# Patient Record
Sex: MALE | Race: WHITE | NOT HISPANIC OR LATINO | Employment: OTHER | URBAN - METROPOLITAN AREA
[De-identification: names, ages, dates, MRNs, and addresses within clinical notes are randomized per-mention and may not be internally consistent; named-entity substitution may affect disease eponyms.]

---

## 2021-12-17 ENCOUNTER — TELEPHONE (OUTPATIENT)
Dept: GASTROENTEROLOGY | Facility: CLINIC | Age: 86
End: 2021-12-17

## 2022-03-14 ENCOUNTER — TELEPHONE (OUTPATIENT)
Dept: GASTROENTEROLOGY | Facility: CLINIC | Age: 87
End: 2022-03-14

## 2022-03-14 ENCOUNTER — OFFICE VISIT (OUTPATIENT)
Dept: GASTROENTEROLOGY | Facility: CLINIC | Age: 87
End: 2022-03-14
Payer: COMMERCIAL

## 2022-03-14 VITALS
WEIGHT: 185 LBS | DIASTOLIC BLOOD PRESSURE: 78 MMHG | BODY MASS INDEX: 25.06 KG/M2 | SYSTOLIC BLOOD PRESSURE: 122 MMHG | HEIGHT: 72 IN | HEART RATE: 51 BPM

## 2022-03-14 DIAGNOSIS — R19.7 DIARRHEA, UNSPECIFIED TYPE: ICD-10-CM

## 2022-03-14 DIAGNOSIS — Z85.528 HISTORY OF KIDNEY CANCER: ICD-10-CM

## 2022-03-14 DIAGNOSIS — C18.2 MALIGNANT NEOPLASM OF ASCENDING COLON (HCC): ICD-10-CM

## 2022-03-14 DIAGNOSIS — Z79.01 ENCOUNTER FOR CURRENT LONG-TERM USE OF ANTICOAGULANTS: ICD-10-CM

## 2022-03-14 DIAGNOSIS — C18.7 CANCER OF SIGMOID COLON (HCC): Primary | ICD-10-CM

## 2022-03-14 DIAGNOSIS — N18.9 CHRONIC KIDNEY DISEASE, UNSPECIFIED CKD STAGE: ICD-10-CM

## 2022-03-14 DIAGNOSIS — Z15.09 LYNCH SYNDROME: ICD-10-CM

## 2022-03-14 DIAGNOSIS — C34.2 MALIGNANT NEOPLASM OF MIDDLE LOBE OF RIGHT LUNG (HCC): ICD-10-CM

## 2022-03-14 DIAGNOSIS — U07.1 COVID-19: ICD-10-CM

## 2022-03-14 DIAGNOSIS — Z90.49 H/O RIGHT HEMICOLECTOMY: ICD-10-CM

## 2022-03-14 PROCEDURE — 99214 OFFICE O/P EST MOD 30 MIN: CPT | Performed by: INTERNAL MEDICINE

## 2022-03-14 RX ORDER — LOPERAMIDE HYDROCHLORIDE 2 MG/1
2 TABLET ORAL 4 TIMES DAILY PRN
COMMUNITY
End: 2022-03-14

## 2022-03-14 RX ORDER — FLUTICASONE FUROATE, UMECLIDINIUM BROMIDE AND VILANTEROL TRIFENATATE 100; 62.5; 25 UG/1; UG/1; UG/1
1 POWDER RESPIRATORY (INHALATION) DAILY
COMMUNITY

## 2022-03-14 RX ORDER — ESCITALOPRAM OXALATE 5 MG/1
5 TABLET ORAL DAILY
COMMUNITY

## 2022-03-14 RX ORDER — ACETAMINOPHEN 325 MG/1
650 TABLET ORAL EVERY 4 HOURS PRN
COMMUNITY

## 2022-03-14 RX ORDER — FINASTERIDE 5 MG/1
5 TABLET, FILM COATED ORAL DAILY
COMMUNITY

## 2022-03-14 RX ORDER — ECHINACEA PURPUREA EXTRACT 125 MG
1 TABLET ORAL 2 TIMES DAILY
COMMUNITY

## 2022-03-14 RX ORDER — METOPROLOL SUCCINATE 50 MG/1
50 TABLET, EXTENDED RELEASE ORAL DAILY
COMMUNITY

## 2022-03-14 RX ORDER — TAMSULOSIN HYDROCHLORIDE 0.4 MG/1
0.4 CAPSULE ORAL
COMMUNITY

## 2022-03-14 RX ORDER — MELATONIN
1000 DAILY
COMMUNITY

## 2022-03-14 RX ORDER — ATORVASTATIN CALCIUM 40 MG/1
40 TABLET, FILM COATED ORAL DAILY
COMMUNITY

## 2022-03-14 RX ORDER — FAMOTIDINE 20 MG/1
20 TABLET, FILM COATED ORAL DAILY
COMMUNITY

## 2022-03-14 RX ORDER — LOPERAMIDE HYDROCHLORIDE 2 MG/1
2 CAPSULE ORAL 4 TIMES DAILY PRN
Qty: 120 CAPSULE | Refills: 3 | Status: SHIPPED | OUTPATIENT
Start: 2022-03-14 | End: 2022-04-11 | Stop reason: SDUPTHER

## 2022-03-14 RX ORDER — LEVOTHYROXINE SODIUM 0.03 MG/1
25 TABLET ORAL DAILY
COMMUNITY

## 2022-03-14 NOTE — PATIENT INSTRUCTIONS
[facility] - Outpatient Consultation  Lelia Bartlett 80 y o  male MRN: [id number]  Encounter: [id number]    ASSESSMENT AND PLAN:      1  Cancer of sigmoid colon [facility])  ---Patient with a malignant polyp resected endoscopically in [facility] by gastroenterologist in spring of 2021  Margins showed invasive carcinoma  Polyp was about 2 2 cm in greatest diameter  Patient had a repeat sigmoidoscopy and the endoscopist - could not relocate the polyp in evidently it had been eradicated  - discussed the situation with the patient and daughter  Conceivably the patient may have no residual polyp where  cancer tissue was located   -  Recommend repeat colonoscopy at hospital setting [place] or [address] [place] [address] in the near future   GoLYTELY or MiraLax prep  -    2  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)  -- patient with 5 radiation therapies- appears to be in remission  3  Villalobos syndrome    --  Patient with multiple cancers related to [facility]  He had a hemicolectomy at age 72 for right colon cancer  This was in the late 1990s  Ten years later had left-sided colon cancer in her resection  Most recently malignant polyp noted  --EGD at same time as colonoscopy     4  H/O right hemicolectomy  -- please see above    5  Encounter for current long-term use of anticoagulants  -- patient had a history of a stroke and history of PE in the past   Will hold  Xareltofor 48 hours prior to  - check with patient's cardiologist   Some small risk of embolic or thrombotic activity with holding the anticoagulate but would be able to do polypectomy if necessary    6  COVID-19  -- a patient without complications related to [place]  He did have aspiration pneumonia but this was prior to COVID diagnosis  This is in late December  Did not require hospitalization for the "    7   Malignant neoplasm of ascending colon ([facility] [address] )  ---reason for the right hemicolectomy back in the 1990s    8  History of kidney cancer  -- left nephrectomy also patient had uterine polyp in the past- also related to NSAID    9  Chronic kidney disease, unspecified CKD stage  -- stage 4 kidney disease  Patient's creatinine was about 3 3 back in January    10  Diarrhea, unspecified type  -- patient reports about 4 loose bowel movements per day  This is a little worse than usual   May be related to his multiple colon resections    Try loperamide 2 mg 2 tablets twice a day before breakfast and before lunch  - could consider  Trial of Questran if not effective      Followup Appointment: 3 mo

## 2022-03-14 NOTE — LETTER
March 17, 2022     Nadeen Acuna MD  [address] [place] [address] Drive  [address]  [address] [facility]    Patient: David Allen   YOB: 1933   Date of Visit: 3/14/2022       Dear Dr Niko Sanderson: Thank you for referring David Allen to me for evaluation  Below are my notes for this consultation  If you have questions, please do not hesitate to call me  I look forward to following your patient along with you  Sincerely,        Gila Jacobs MD        CC: DO Gila Santoro MD  3/17/2022 10:48 AM  Incomplete    [facility] - Outpatient Consultation  David Allen 80 y o  male MRN: [id number]  Encounter: [id number]    ASSESSMENT AND PLAN:      1  Cancer of sigmoid colon [facility])  ---Patient with a malignant polyp resected endoscopically in [facility] by gastroenterologist in spring of 2021  Margins showed invasive carcinoma  Polyp was about 2 2 cm in greatest diameter  Patient had a repeat sigmoidoscopy and the endoscopies could not relocate the polyp in evidently it had been eradicated  - discussed the situation with the patient and daughter  Conceivably the patient may have no residual polyp were cancer tissue   -  Recommend repeat colonoscopy at hospital setting [place] or [address] [place] [address] in the near future   GoLYTELY or MiraLax prep  -    2  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 [facility])  -- patient with 5 radiation therapies- appears to be in remission  3  Villalobos syndrome    --  Patient with multiple cancers related to [facility]  He had a hemicolectomy at age 72 for right colon cancer  This was in the late 1990s  Ten years later had left-sided colon cancer in her resection  Most recently malignant polyp noted  --EGD at same time as colonoscopy     4  H/O right hemicolectomy  -- please see above    5   Encounter for current long-term use of anticoagulants  -- patient had a history of a stroke and history of PE in the past   Will hold  Xareltofor 48 hours prior to  - check with patient's cardiologist   Some small risk of embolic or thrombotic activity with holding the anticoagulate but would be able to do polypectomy if necessary    6  COVID-19  -- a patient without complications related to [place]  He did have aspiration pneumonia but this was prior to COVID diagnosis  This is in late December  Did not require hospitalization for the "    7  Malignant neoplasm of ascending colon ([facility] [address] )  ---reason for the right hemicolectomy back in the 1990s    8  History of kidney cancer  -- left nephrectomy also patient had uterine polyp in the past- also related to NSAID    9  Chronic kidney disease, unspecified CKD stage  -- stage 4 kidney disease  Patient's creatinine was about 3 3 back in January    10  Diarrhea, unspecified type  -- patient reports about 4 loose bowel movements per day  This is a little worse than usual   May be related to his multiple colon resections  Try loperamide 2 mg 2 tablets twice a day before breakfast and before lunch  - could consider  Trial of Questran if not effective      Followup Appointment: 3 mo   ______________________________________________________________________    Chief Complaint   Patient presents with    Hx of colon ca, wants new GI dr      Patient is referred by his oncologist Dr Stephen  for evaluation of a history of a malignant sigmoid colon polyp    HPI:   Erendira Lira is a 80y o  year old male who presents  For GI evaluation for history of Villalobos syndrome and a history of a malignant sigmoid colon polyp  Patient has complicated history with respect to colon cancer and had a right hemicolectomy for lesion in the late 1990s  About 10 years later he had subsequent left colon resection for  Subsequent tumor  Patient underwent colonoscopy last year and had a 2 2 cm polyp in greatest dimension in the sigmoid colon  This was resected    Pathology came back positive for invasive carcinoma the margins  This was done last April  Patient has had a subsequent  In June 2021 sigmoidoscopy for tattooing of the area as he was referred for surgery  The lesion was not visible on reinspection at that time  Prep may have been subopitmal at that time  Orignal report from April stated the lesion was in the descending colon  Patient does report over the last year so having increasing stool frequency  He has about 4 loose bowel movements a day  He does have some urgency and occasional incontinence  Denies any blood per rectum  There is no abdominal pain  With respect to his other issues patient was diagnosed with non-small cell lung cancer I believe right middle lobe  This was treated with 5 sessions of radiation therapy  This seems to be under good control at present according to the patient's daughter  Patient also had a nephrectomy in the past for kidney cancer and this involved the ureter as well  As result he does have issues with chronic kidney disease  Patient had 2 siblings who succumbed to cancer both with urogenital cancer  Patient did have recent hospitalization for aspiration pneumonia  This was in late December  When he was about to be discharged she was tested positive for COVID and was observed in the hospital further  He did not have complications related to COVID  He is not on oxygen  Patient has had a history of a syncopal episode and have pacemaker insertion  Patient does have a history of PE in the past and history of CVA and is on Xarelto    Other medical problems include congestive heart failure    Historical Information   Past Medical History:   Diagnosis Date    Allergic rhinitis     Anemia     Atrial fibrillation (HCC)     Cancer of kidney ([facility] [address] )     Cardiac defibrillator in place     Chronic diastolic (congestive) heart failure (HCC)     Chronic kidney disease     Colon cancer ([facility] [address] )     Colon polyp     COPD (chronic obstructive pulmonary disease) ([facility] [address] )     Coronary artery disease     Diabetes mellitus ([facility] )     Dysphagia     GERD (gastroesophageal reflux disease)     Hyperkalemia     Hyperlipidemia     Hyperosmolality and hypernatremia     Hypertension     Hypothyroidism     Lung cancer ([facility] )     Neuromuscular dysfunction of bladder, unspecified     Nonrheumatic aortic (valve) stenosis     Polyneuropathy     Pulmonary embolism (HCC)     Sick sinus syndrome (HCC)     Venous insufficiency      Past Surgical History:   Procedure Laterality Date    ABDOMINAL SURGERY      APPENDECTOMY      CARDIAC DEFIBRILLATOR PLACEMENT      COLON SURGERY      COLONOSCOPY      HERNIA REPAIR      UPPER GASTROINTESTINAL ENDOSCOPY       Social History     Substance and Sexual Activity   Alcohol Use Not Currently     Social History     Substance and Sexual Activity   Drug Use Never     Social History     Tobacco Use   Smoking Status Never Smoker   Smokeless Tobacco Never Used     Family History   Problem Relation Age of Onset    Heart disease Mother     Heart disease Father     Cancer Sister     Cancer Brother        Meds/Allergies     Current Outpatient Medications:     acetaminophen (TYLENOL) 325 mg tablet    albuterol (Albuterol Sulfate) (5 mg/mL) 0 5 % nebulizer solution    atorvastatin (LIPITOR) 40 mg tablet    cholecalciferol (VITAMIN D3) 1,000 units tablet    cyanocobalamin (VITAMIN B-12) 1000 MCG tablet    escitalopram (LEXAPRO) 5 mg tablet    famotidine (PEPCID) 20 mg tablet    finasteride (PROSCAR) 5 mg tablet    fluticasone-umeclidinium-vilanterol (Trelegy Ellipta) 100-62 5-25 MCG/INH inhaler    levothyroxine 25 mcg tablet    metoprolol succinate (TOPROL-XL) 50 mg 24 hr tablet    Multiple Vitamins-Minerals (PRESERVISION AREDS PO)    rivaroxaban (Xarelto) 15 mg tablet    sodium chloride (Deep Sea Nasal [place]) 0 65 % nasal spray    tamsulosin (Flomax) 0 4 mg    Allergies   Allergen Reactions    Aspirin Other (See Comments)     Unknown    Augmentin [Amoxicillin-Pot Clavulanate] Other (See Comments)     Unknown      Avelox [Moxifloxacin] Other (See Comments)     Unknown      Cephalosporins Other (See Comments)     unknown    Clindamycin Other (See Comments)     unknown    Iv Contrast [Iodinated Diagnostic Agents] Other (See Comments)     unknown    Latex Other (See Comments)     Unknown      Lisinopril Other (See Comments)     Unknown      Penicillins Other (See Comments)     Unknown         PHYSICAL EXAM:    Blood pressure 122/78, pulse (!) 51, height 6' (1 829 m), weight 83 9 kg (185 lb)  Body mass index is 25 09 kg/m²  General Appearance: NAD, cooperative, alert  Eyes: Anicteric, conjunctiva pink  ENT:  Normocephalic, atraumatic, normal mucosa  Neck:  Supple, symmetrical, trachea midline,   Resp:  Clear to auscultation bilaterally; no rales, rhonchi or wheezing; respirations unlabored   CV:  S1 S2, Regular rate and rhythm; no murmur, rub, or gallop  GI:  Soft, non-tender, non-distended; normal bowel sounds; no masses, no organomegaly -- midline abdominal scar  Rectal: Deferred  Musculoskeletal: No cyanosis, clubbing or edema  Normal ROM  Skin:  No jaundice, rashes, or lesions -- fragile skin and easy bruisability  Heme/Lymph: No palpable cervical lymphadenopathy  Psych: Normal affect, good eye contact  Neuro: No gross deficits, AAOx3    Lab Results:    most recent hemoglobin February 2022 11 2 normal MCV  Creatinine January 2022 3 3      REVIEW OF SYSTEMS:    CONSTITUTIONAL: Denies any fever, chills, rigors, positive for 20 lb weight loss in the last 4-6 months  HEENT: No earache or tinnitus  Positive for recent nose bleeds possibly related to nasal O2 in the hospital  CARDIOVASCULAR: No chest pain or palpitations- positive for shortness of breath with exertion  RESPIRATORY: Denies any cough, hemoptysis  GASTROINTESTINAL: As noted in the History of Present Illness  GENITOURINARY: No problems with urination   Denies any hematuria or dysuria  NEUROLOGIC: No dizziness or vertigo, denies headaches  MUSCULOSKELETAL: Denies any muscle or joint pain  SKIN: Denies skin rashes or itching - positive for easy skin bruises  ENDOCRINE: Denies excessive thirst  Denies intolerance to heat or cold  PSYCHOSOCIAL:-- perhaps mild reactive depression lost his wife about 2 years ago now in skilled nursing  Denies any recent memory loss  Ana Boone MD  3/14/2022 12:54 PM  Sign when Signing Visit    [email or web address] - Outpatient Consultation  Roosevelt Cadena 80 y o  male MRN: [id number]  Encounter: [id number]    ASSESSMENT AND PLAN:      1  Cancer of sigmoid colon [facility])  ---Patient with a malignant polyp resected endoscopically in [facility] by gastroenterologist in spring of 2021  Margins showed invasive carcinoma  Polyp was about 2 2 cm in greatest diameter  Patient had a repeat sigmoidoscopy and the endoscopies could not relocate the polyp in evidently it had been eradicated  - discussed the situation with the patient and daughter  Conceivably the patient may have no residual polyp were cancer tissue   -  Recommend repeat colonoscopy at hospital setting [place] or [address] [place] [address] in the near future   GoLYTELY or MiraLax prep  -    2  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 [facility])  -- patient with 5 radiation therapies- appears to be in remission  3  Villalobos syndrome    --  Patient with multiple cancers related to [facility]  He had a hemicolectomy at age 72 for right colon cancer  This was in the late 1990s  Ten years later had left-sided colon cancer in her resection  Most recently malignant polyp noted  --EGD at same time as colonoscopy     4  H/O right hemicolectomy  -- please see above    5   Encounter for current long-term use of anticoagulants  -- patient had a history of a stroke and history of PE in the past   Will hold  Xareltofor 48 hours prior to  - check with patient's cardiologist   Some small risk of embolic or thrombotic activity with holding the anticoagulate but would be able to do polypectomy if necessary    6  COVID-19  -- a patient without complications related to [place]  He did have aspiration pneumonia but this was prior to COVID diagnosis  This is in late December  Did not require hospitalization for the "    7  Malignant neoplasm of ascending colon ([facility] [address] )  ---reason for the right hemicolectomy back in the 1990s    8  History of kidney cancer  -- left nephrectomy also patient had uterine polyp in the past- also related to NSAID    9  Chronic kidney disease, unspecified CKD stage  -- stage 4 kidney disease  Patient's creatinine was about 3 3 back in January    10  Diarrhea, unspecified type  -- patient reports about 4 loose bowel movements per day  This is a little worse than usual   May be related to his multiple colon resections  Try loperamide 2 mg 2 tablets twice a day before breakfast and before lunch  - could consider  Trial of Questran if not effective      Followup Appointment: 3 mo   ______________________________________________________________________    Chief Complaint   Patient presents with    Hx of colon ca, wants new GI dr      Patient is referred by his oncologist Dr Stephen  for evaluation of a history of a malignant sigmoid colon polyp  HPI:   Wilbert Bonilla is a 80y o  year old male who presents  For GI evaluation for history of Villalobos syndrome and a history of a malignant sigmoid colon polyp  Patient has complicated history with respect to colon cancer and had a right hemicolectomy for lesion in the late 1990s  About 10 years later he had subsequent left colon resection for  Subsequent tumor  Patient underwent colonoscopy last year and had a 2 2 cm polyp in greatest dimension in the sigmoid colon  This was resected  Pathology came back positive for invasive carcinoma the margins  This was done last April    Patient has had a subsequent sigmoidoscopy for tattooing of the area as he was referred for surgery  The lesion was not visible on reinspection at that time  Patient does report over the last year so having increasing stool frequency  He has about 4 loose bowel movements a day  He does have some urgency and occasional incontinence  Denies any blood per rectum  There is no abdominal pain  With respect to his other issues patient was diagnosed with non-small cell lung cancer I believe right middle lobe  This was treated with 5 sessions of radiation therapy  This seems to be under good control at present according to the patient's daughter  Patient also had a nephrectomy in the past for kidney cancer and this involved the ureter as well  As result he does have issues with chronic kidney disease  Patient had 2 siblings who succumbed to cancer both with urogenital cancer  Patient did have recent hospitalization for aspiration pneumonia  This was in late December  When he was about to be discharged she was tested positive for COVID and was observed in the hospital further  He did not have complications related to COVID  He is not on oxygen  Patient has had a history of a syncopal episode and have pacemaker insertion  Patient does have a history of PE in the past and history of CVA and is on Xarelto    Other medical problems include congestive heart failure    Historical Information   Past Medical History:   Diagnosis Date    Allergic rhinitis     Anemia     Atrial fibrillation (HCC)     Cancer [facility] kidney ([facility] [address] )     Cardiac defibrillator in place     Chronic diastolic (congestive) heart failure (HCC)     Chronic kidney disease     Colon cancer (HCC)     Colon polyp     COPD (chronic obstructive pulmonary disease) (HCC)     Coronary artery disease     Diabetes mellitus ([address] )     Dysphagia     GERD (gastroesophageal reflux disease)     Hyperkalemia     Hyperlipidemia     Hyperosmolality and hypernatremia     Hypertension     Hypothyroidism     [facility]     Neuromuscular dysfunction of bladder, unspecified     Nonrheumatic aortic (valve) stenosis     Polyneuropathy     Pulmonary embolism (HCC)     Sick sinus syndrome ([facility] )     Venous insufficiency      Past Surgical History:   Procedure Laterality Date    ABDOMINAL SURGERY      APPENDECTOMY      CARDIAC DEFIBRILLATOR PLACEMENT      COLON SURGERY      COLONOSCOPY      HERNIA REPAIR      UPPER GASTROINTESTINAL ENDOSCOPY       Social History     Substance and Sexual Activity   Alcohol Use Not Currently     Social History     Substance and Sexual Activity   Drug Use Never     Social History     Tobacco Use   Smoking Status Never Smoker   Smokeless Tobacco Never Used     Family History   Problem Relation Age of Onset    Heart disease Mother     Heart disease Father     Cancer Sister     Cancer Brother        Meds/Allergies     Current Outpatient Medications:     acetaminophen (TYLENOL) 325 mg tablet    albuterol (Albuterol Sulfate) (5 mg/mL) 0 5 % nebulizer solution    atorvastatin (LIPITOR) 40 mg tablet    cholecalciferol (VITAMIN D3) 1,000 units tablet    cyanocobalamin (VITAMIN B-12) 1000 MCG tablet    escitalopram (LEXAPRO) 5 mg tablet    famotidine (PEPCID) 20 mg tablet    finasteride (PROSCAR) 5 mg tablet    fluticasone-umeclidinium-vilanterol (Trelegy Ellipta) 100-62 5-25 MCG/INH inhaler    levothyroxine 25 mcg tablet    metoprolol succinate (TOPROL-XL) 50 mg 24 hr tablet    Multiple Vitamins-Minerals (PRESERVISION AREDS PO)    rivaroxaban (Xarelto) 15 mg tablet    sodium chloride (Deep Sea Nasal [place]) 0 65 % nasal spray    tamsulosin (Flomax) 0 4 mg    Allergies   Allergen Reactions    Aspirin Other (See Comments)     Unknown    Augmentin [Amoxicillin-Pot Clavulanate] Other (See Comments)     Unknown      Avelox [Moxifloxacin] Other (See Comments)     Unknown      Cephalosporins Other (See Comments)     unknown    Clindamycin Other (See Comments)     unknown    Iv Contrast [Iodinated Diagnostic Agents] Other (See Comments)     unknown    Latex Other (See Comments)     Unknown      Lisinopril Other (See Comments)     Unknown      Penicillins Other (See Comments)     Unknown         PHYSICAL EXAM:    Blood pressure 122/78, pulse (!) 51, height 6' (1 829 m), weight 83 9 kg (185 lb)  Body mass index is 25 09 kg/m²  General Appearance: NAD, cooperative, alert  Eyes: Anicteric, conjunctiva pink  ENT:  Normocephalic, atraumatic, normal mucosa  Neck:  Supple, symmetrical, trachea midline,   Resp:  Clear to auscultation bilaterally; no rales, rhonchi or wheezing; respirations unlabored   CV:  S1 S2, Regular rate and rhythm; no murmur, rub, or gallop  GI:  Soft, non-tender, non-distended; normal bowel sounds; no masses, no organomegaly -- midline abdominal scar  Rectal: Deferred  Musculoskeletal: No cyanosis, clubbing or edema  Normal ROM  Skin:  No jaundice, rashes, or lesions -- fragile skin and easy bruisability  Heme/Lymph: No palpable cervical lymphadenopathy  Psych: Normal affect, good eye contact  Neuro: No gross deficits, AAOx3    Lab Results:    most recent hemoglobin February 2022 11 2 normal MCV  Creatinine January 2022 3 3      REVIEW OF SYSTEMS:    CONSTITUTIONAL: Denies any fever, chills, rigors, positive for 20 lb weight loss in the last 4-6 months  HEENT: No earache or tinnitus  Positive for recent nose bleeds possibly related to nasal O2 in the hospital  CARDIOVASCULAR: No chest pain or palpitations- positive for shortness of breath with exertion  RESPIRATORY: Denies any cough, hemoptysis  GASTROINTESTINAL: As noted in the History of Present Illness  GENITOURINARY: No problems with urination  Denies any hematuria or dysuria  NEUROLOGIC: No dizziness or vertigo, denies headaches  MUSCULOSKELETAL: Denies any muscle or joint pain     SKIN: Denies skin rashes or itching - positive for easy skin bruises  ENDOCRINE: Denies excessive thirst  Denies intolerance to heat or cold  PSYCHOSOCIAL:-- perhaps mild reactive depression lost his wife about 2 years ago now in skilled nursing  Denies any recent memory loss

## 2022-03-14 NOTE — PROGRESS NOTES
[address] [employer] [employer] [facility] - Outpatient Consultation  Yasmine Nicholson 80 y o  male MRN: [id number]  Encounter: [id number]    ASSESSMENT AND PLAN:      1  Cancer of sigmoid colon [facility])  ---Patient with a malignant polyp resected endoscopically in [facility] by gastroenterologist in spring of 2021  Margins showed invasive carcinoma  Polyp was about 2 2 cm in greatest diameter  Patient had a repeat sigmoidoscopy and the endoscopies could not relocate the polyp in evidently it had been eradicated  - discussed the situation with the patient and daughter  Conceivably the patient may have no residual polyp were cancer tissue   -  Recommend repeat colonoscopy at hospital setting [facility] or [address] [place] [address] in the near future   GoLYTELY or MiraLax prep  -    2  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 [facility])  -- patient with 5 radiation therapies- appears to be in remission  3  Villalobos syndrome    --  Patient with multiple cancers related to [facility]  He had a hemicolectomy at age 72 for right colon cancer  This was in the late 1990s  Ten years later had left-sided colon cancer in her resection  Most recently malignant polyp noted  --EGD at same time as colonoscopy     4  H/O right hemicolectomy  -- please see above    5  Encounter for current long-term use of anticoagulants  -- patient had a history of a stroke and history of PE in the past   Will hold  Xareltofor 48 hours prior to  - check with patient's cardiologist   Some small risk of embolic or thrombotic activity with holding the anticoagulate but would be able to do polypectomy if necessary    6  COVID-19  -- a patient without complications related to [place]  He did have aspiration pneumonia but this was prior to COVID diagnosis  This is in late December  Did not require hospitalization for the "    7  Malignant neoplasm of ascending colon ([facility] [address] )  ---reason for the right hemicolectomy back in the 1990s    8  History of kidney cancer  -- left nephrectomy also patient had uterine polyp in the past- also related to NSAID    9  Chronic kidney disease, unspecified CKD stage  -- stage 4 kidney disease  Patient's creatinine was about 3 3 back in January    10  Diarrhea, unspecified type  -- patient reports about 4 loose bowel movements per day  This is a little worse than usual   May be related to his multiple colon resections  Try loperamide 2 mg 2 tablets twice a day before breakfast and before lunch  - could consider  Trial of Questran if not effective      Followup Appointment: 3 mo   ______________________________________________________________________    Chief Complaint   Patient presents with    Hx of colon ca, wants new GI dr      Patient is referred by his oncologist Dr Stephen  for evaluation of a history of a malignant sigmoid colon polyp    HPI:   Erendira Lira is a 80y o  year old male who presents  For GI evaluation for history of Villalobos syndrome and a history of a malignant sigmoid colon polyp  Patient has complicated history with respect to colon cancer and had a right hemicolectomy for lesion in the late 1990s  About 10 years later he had subsequent left colon resection for  Subsequent tumor  Patient underwent colonoscopy last year and had a 2 2 cm polyp in greatest dimension in the sigmoid colon  This was resected  Pathology came back positive for invasive carcinoma the margins  This was done last April  Patient has had a subsequent  In June 2021 sigmoidoscopy for tattooing of the area as he was referred for surgery  The lesion was not visible on reinspection at that time  Prep may have been subopitmal at that time  Orignal report from April stated the lesion was in the descending colon  Patient does report over the last year so having increasing stool frequency  He has about 4 loose bowel movements a day  He does have some urgency and occasional incontinence  Denies any blood per rectum    There is no abdominal pain  With respect to his other issues patient was diagnosed with non-small cell lung cancer I believe right middle lobe  This was treated with 5 sessions of radiation therapy  This seems to be under good control at present according to the patient's daughter  Patient also had a nephrectomy in the past for kidney cancer and this involved the ureter as well  As result he does have issues with chronic kidney disease  Patient had 2 siblings who succumbed to cancer both with urogenital cancer  Patient did have recent hospitalization for aspiration pneumonia  This was in late December  When he was about to be discharged she was tested positive for COVID and was observed in the hospital further  He did not have complications related to COVID  He is not on oxygen  Patient has had a history of a syncopal episode and have pacemaker insertion  Patient does have a history of PE in the past and history of CVA and is on Xarelto    Other medical problems include congestive heart failure    Historical Information   Past Medical History:   Diagnosis Date    Allergic rhinitis     Anemia     Atrial fibrillation (HCC)     Cancer of kidney ([facility] [address] )     Cardiac defibrillator in place     Chronic diastolic (congestive) heart failure (HCC)     Chronic kidney disease     Colon cancer (HCC)     Colon polyp     COPD (chronic obstructive pulmonary disease) (HCC)     Coronary artery disease     Diabetes mellitus ([address] )     Dysphagia     GERD (gastroesophageal reflux disease)     Hyperkalemia     Hyperlipidemia     Hyperosmolality and hypernatremia     Hypertension     Hypothyroidism     Lung cancer ([facility] [facility] )     Neuromuscular dysfunction of bladder, unspecified     Nonrheumatic aortic (valve) stenosis     Polyneuropathy     Pulmonary embolism (HCC)     Sick sinus syndrome (HCC)     Venous insufficiency      Past Surgical History:   Procedure Laterality Date    ABDOMINAL SURGERY      APPENDECTOMY      CARDIAC DEFIBRILLATOR PLACEMENT      COLON SURGERY      COLONOSCOPY      HERNIA REPAIR      UPPER GASTROINTESTINAL ENDOSCOPY       Social History     Substance and Sexual Activity   Alcohol Use Not Currently     Social History     Substance and Sexual Activity   Drug Use Never     Social History     Tobacco Use   Smoking Status Never Smoker   Smokeless Tobacco Never Used     Family History   Problem Relation Age of Onset    Heart disease Mother     Heart disease Father     Cancer Sister     Cancer Brother        Meds/Allergies     Current Outpatient Medications:     acetaminophen (TYLENOL) 325 mg tablet    albuterol (Albuterol Sulfate) (5 mg/mL) 0 5 % nebulizer solution    atorvastatin (LIPITOR) 40 mg tablet    cholecalciferol (VITAMIN D3) 1,000 units tablet    cyanocobalamin (VITAMIN B-12) 1000 MCG tablet    escitalopram (LEXAPRO) 5 mg tablet    famotidine (PEPCID) 20 mg tablet    finasteride (PROSCAR) 5 mg tablet    fluticasone-umeclidinium-vilanterol (Trelegy Ellipta) 100-62 5-25 MCG/INH inhaler    levothyroxine 25 mcg tablet    metoprolol succinate (TOPROL-XL) 50 mg 24 hr tablet    Multiple Vitamins-Minerals (PRESERVISION AREDS PO)    rivaroxaban (Xarelto) 15 mg tablet    sodium chloride (Deep Sea Nasal [place]) 0 65 % nasal spray    tamsulosin (Flomax) 0 4 mg    Allergies   Allergen Reactions    Aspirin Other (See Comments)     Unknown    Augmentin [Amoxicillin-Pot Clavulanate] Other (See Comments)     Unknown      Avelox [Moxifloxacin] Other (See Comments)     Unknown      Cephalosporins Other (See Comments)     unknown    Clindamycin Other (See Comments)     unknown    Iv Contrast [Iodinated Diagnostic Agents] Other (See Comments)     unknown    Latex Other (See Comments)     Unknown      Lisinopril Other (See Comments)     Unknown      Penicillins Other (See Comments)     Unknown         PHYSICAL EXAM:    Blood pressure 122/78, pulse (!) 51, height 6' (1 829 m), weight 83 9 kg (185 lb)  Body mass index is 25 09 kg/m²  General Appearance: NAD, cooperative, alert  Eyes: Anicteric, conjunctiva pink  ENT:  Normocephalic, atraumatic, normal mucosa  Neck:  Supple, symmetrical, trachea midline,   Resp:  Clear to auscultation bilaterally; no rales, rhonchi or wheezing; respirations unlabored   CV:  S1 S2, Regular rate and rhythm; no murmur, rub, or gallop  GI:  Soft, non-tender, non-distended; normal bowel sounds; no masses, no organomegaly -- midline abdominal scar  Rectal: Deferred  Musculoskeletal: No cyanosis, clubbing or edema  Normal ROM  Skin:  No jaundice, rashes, or lesions -- fragile skin and easy bruisability  Heme/Lymph: No palpable cervical lymphadenopathy  Psych: Normal affect, good eye contact  Neuro: No gross deficits, AAOx3    Lab Results:    most recent hemoglobin February 2022 11 2 normal MCV  Creatinine January 2022 3 3      REVIEW OF SYSTEMS:    CONSTITUTIONAL: Denies any fever, chills, rigors, positive for 20 lb weight loss in the last 4-6 months  HEENT: No earache or tinnitus  Positive for recent nose bleeds possibly related to nasal O2 in the hospital  CARDIOVASCULAR: No chest pain or palpitations- positive for shortness of breath with exertion  RESPIRATORY: Denies any cough, hemoptysis  GASTROINTESTINAL: As noted in the History of Present Illness  GENITOURINARY: No problems with urination  Denies any hematuria or dysuria  NEUROLOGIC: No dizziness or vertigo, denies headaches  MUSCULOSKELETAL: Denies any muscle or joint pain  SKIN: Denies skin rashes or itching - positive for easy skin bruises  ENDOCRINE: Denies excessive thirst  Denies intolerance to heat or cold  PSYCHOSOCIAL:-- perhaps mild reactive depression lost his wife about 2 years ago now in skilled nursing  Denies any recent memory loss

## 2022-03-14 NOTE — TELEPHONE ENCOUNTER
Scheduled date of colonoscopy (as of today):4/20/22  Physician performing colonoscopy:Dr Daisy Eduardo  Location of colonoscopy:[facility]  Bowel prep reviewed with patient:Colyte  Instructions reviewed with patient by: Lodema Forget  Clearances:  Yes

## 2022-03-17 ENCOUNTER — TELEPHONE (OUTPATIENT)
Dept: GASTROENTEROLOGY | Facility: CLINIC | Age: 87
End: 2022-03-17

## 2022-03-17 NOTE — TELEPHONE ENCOUNTER
Need Cardiac clearance from patient's cardiologist - Dr Calin Young-- in [facility] care everywhere  Please send copy of report - my note - to Dr Quintin Suarez - can't find in epic address t - please check with daughter -  Thanks     Keyonna Smith

## 2022-04-01 NOTE — TELEPHONE ENCOUNTER
Spoke with Bethany Chandler at [facility] and advised of Xarelto hold for two days  Last dose will be 4/17/22  She advised that patient's latest Hgb  Was 5 2 and that he had 3 heme positive stools  She will fax current hgb  results

## 2022-04-05 ENCOUNTER — TELEPHONE (OUTPATIENT)
Dept: GASTROENTEROLOGY | Facility: CLINIC | Age: 87
End: 2022-04-05

## 2022-04-05 NOTE — TELEPHONE ENCOUNTER
Duy Ingram, nurse at [facility] states she needs clarification on Imodium order  She is reviewing AVS 3/14 which indicates Imodium 4xday as needed  Pt's daughter said in the office that the Dr clearly stated to take 4x/day; questions the "as needed"  Also, she is unsure if the Dr is aware that the Pt takes 4 mg every morning? # [id number] [phone]

## 2022-04-11 DIAGNOSIS — R19.7 DIARRHEA, UNSPECIFIED TYPE: ICD-10-CM

## 2022-04-11 RX ORDER — LOPERAMIDE HYDROCHLORIDE 2 MG/1
2 CAPSULE ORAL 4 TIMES DAILY PRN
Qty: 120 CAPSULE | Refills: 3 | Status: SHIPPED | OUTPATIENT
Start: 2022-04-11 | End: 2022-05-11

## 2022-04-11 RX ORDER — LOPERAMIDE HYDROCHLORIDE 2 MG/1
2 CAPSULE ORAL 4 TIMES DAILY PRN
Qty: 120 CAPSULE | Refills: 3 | Status: SHIPPED | OUTPATIENT
Start: 2022-04-11 | End: 2022-04-11

## 2022-04-11 NOTE — TELEPHONE ENCOUNTER
Patients prescription was set to "print" caretaker requested it be sent electronically to [facility]  Sent as requested      E-Prescribing Status      Outpatient Medication Detail    loperamide (IMODIUM) 2 mg capsule        Sig: Take 1 capsule (2 mg total) by mouth 4 (four) times a day as needed for diarrhea        Sent to pharmacy as: loperamide (IMODIUM) 2 mg capsule        Class: Normal        Route: Oral        E-Prescribing Status: Sent to pharmacy (4/11/2022 12:02 PM EDT)

## 2022-04-12 ENCOUNTER — TELEPHONE (OUTPATIENT)
Dept: GASTROENTEROLOGY | Facility: CLINIC | Age: 87
End: 2022-04-12

## 2022-04-12 DIAGNOSIS — Z15.09 LYNCH SYNDROME: ICD-10-CM

## 2022-04-12 DIAGNOSIS — R19.7 DIARRHEA, UNSPECIFIED TYPE: Primary | ICD-10-CM

## 2022-04-12 NOTE — TELEPHONE ENCOUNTER
I called the patient's daughter and I also called the patient's skilled nursing facility  Left a message is  Patient's hemoglobin is down to 8 9  Was 9 2 about a week ago  Previously hemoglobin was in the normal range early in the year at around 12 g  Agree with stopping Xarelto  Should have a repeat CBC early in the week  If he has black stool or dizziness he should be taken to the local ED

## 2022-04-12 NOTE — TELEPHONE ENCOUNTER
Mia Waldrop from [facility] left [employer] stating NP there had labs drawn and Pt's Hgb is down to 8 9  Pt is on Xarelto; has colon scheduled for possible GI bleed so Xarelto was stopped  Calling to advise that they will ck CBC in one wk  If ques CB # [id number] [phone]

## 2022-04-12 NOTE — TELEPHONE ENCOUNTER
Called [facility] back, spoke with Reese Lovett and she will ask Eliot Jean-Baptiste to call back  Wanted to request results be faxed as well  Please see previous message--pt's hemoglobin is 8 9 and will recheck in 1 week  Pt's hemoglobin from 4/4 was 9 2  Pt's colon/egd is scheduled 4/20  Please send any recommendations, if necessary, back to the clinical pool as I will not be in the rest of the week    Thank you

## 2022-04-13 RX ORDER — SODIUM PICOSULFATE, MAGNESIUM OXIDE, AND ANHYDROUS CITRIC ACID 10; 3.5; 12 MG/160ML; G/160ML; G/160ML
LIQUID ORAL
Qty: 160 ML | Refills: 0 | Status: SHIPPED | OUTPATIENT
Start: 2022-04-13

## 2022-04-13 NOTE — TELEPHONE ENCOUNTER
Patient's dtr wanted to change the prep to Clenpiq  The patient is on a thickened diet and wanted a low volume prep t  I emailed the instructions to Carolee Sky (nurse at the home), patient's dtr will  sample

## 2022-04-19 NOTE — TELEPHONE ENCOUNTER
I called Shantelle Sapp spoke with [address] [phone]  Patient had his CBC drawn 4/18/22 Hgb 9 5  I requested that results be faxed to [phone]  Patient is to have colonoscopy performed at [address] tomorrow

## 2022-04-20 ENCOUNTER — HOSPITAL ENCOUNTER (OUTPATIENT)
Dept: GASTROENTEROLOGY | Facility: HOSPITAL | Age: 87
Setting detail: OUTPATIENT SURGERY
Discharge: HOME | End: 2022-04-20
Attending: INTERNAL MEDICINE
Payer: COMMERCIAL

## 2022-04-20 ENCOUNTER — ANESTHESIA EVENT (OUTPATIENT)
Dept: GASTROENTEROLOGY | Facility: HOSPITAL | Age: 87
End: 2022-04-20

## 2022-04-20 ENCOUNTER — APPOINTMENT (EMERGENCY)
Dept: RADIOLOGY | Facility: HOSPITAL | Age: 87
End: 2022-04-20
Payer: COMMERCIAL

## 2022-04-20 ENCOUNTER — APPOINTMENT (EMERGENCY)
Dept: NON INVASIVE DIAGNOSTICS | Facility: HOSPITAL | Age: 87
End: 2022-04-20
Payer: COMMERCIAL

## 2022-04-20 ENCOUNTER — APPOINTMENT (EMERGENCY)
Dept: RADIOLOGY | Facility: HOSPITAL | Age: 87
End: 2022-04-20
Attending: EMERGENCY MEDICINE
Payer: COMMERCIAL

## 2022-04-20 ENCOUNTER — ANESTHESIA (OUTPATIENT)
Dept: GASTROENTEROLOGY | Facility: HOSPITAL | Age: 87
End: 2022-04-20

## 2022-04-20 ENCOUNTER — HOSPITAL ENCOUNTER (EMERGENCY)
Facility: HOSPITAL | Age: 87
Discharge: HOME | End: 2022-04-20
Attending: EMERGENCY MEDICINE
Payer: COMMERCIAL

## 2022-04-20 VITALS
DIASTOLIC BLOOD PRESSURE: 62 MMHG | RESPIRATION RATE: 20 BRPM | HEART RATE: 69 BPM | HEIGHT: 72 IN | WEIGHT: 190 LBS | TEMPERATURE: 96.7 F | SYSTOLIC BLOOD PRESSURE: 124 MMHG | BODY MASS INDEX: 25.73 KG/M2 | OXYGEN SATURATION: 96 %

## 2022-04-20 VITALS
WEIGHT: 190 LBS | HEIGHT: 72 IN | OXYGEN SATURATION: 94 % | RESPIRATION RATE: 18 BRPM | TEMPERATURE: 97.3 F | BODY MASS INDEX: 25.73 KG/M2 | SYSTOLIC BLOOD PRESSURE: 127 MMHG | HEART RATE: 70 BPM | DIASTOLIC BLOOD PRESSURE: 60 MMHG

## 2022-04-20 DIAGNOSIS — Z15.09 LYNCH SYNDROME: ICD-10-CM

## 2022-04-20 DIAGNOSIS — C18.7 CANCER OF SIGMOID COLON (HCC): ICD-10-CM

## 2022-04-20 DIAGNOSIS — R19.7 DIARRHEA, UNSPECIFIED TYPE: ICD-10-CM

## 2022-04-20 DIAGNOSIS — E87.70 FLUID OVERLOAD: Primary | ICD-10-CM

## 2022-04-20 LAB
ALBUMIN SERPL BCP-MCNC: 2.1 G/DL (ref 3.5–5)
ALP SERPL-CCNC: 117 U/L (ref 46–116)
ALT SERPL W P-5'-P-CCNC: 45 U/L (ref 12–78)
ANION GAP SERPL CALCULATED.3IONS-SCNC: 6 MMOL/L (ref 4–13)
APTT PPP: 33 SECONDS (ref 23–37)
AST SERPL W P-5'-P-CCNC: 22 U/L (ref 5–45)
BASOPHILS # BLD AUTO: 0.01 THOUSANDS/ΜL (ref 0–0.1)
BASOPHILS NFR BLD AUTO: 0 % (ref 0–1)
BILIRUB SERPL-MCNC: 0.3 MG/DL (ref 0.2–1)
BUN SERPL-MCNC: 15 MG/DL (ref 5–25)
CALCIUM ALBUM COR SERPL-MCNC: 9.6 MG/DL (ref 8.3–10.1)
CALCIUM SERPL-MCNC: 8.1 MG/DL (ref 8.3–10.1)
CHLORIDE SERPL-SCNC: 110 MMOL/L (ref 100–108)
CO2 SERPL-SCNC: 25 MMOL/L (ref 21–32)
CREAT SERPL-MCNC: 1.39 MG/DL (ref 0.6–1.3)
EOSINOPHIL # BLD AUTO: 0.04 THOUSAND/ΜL (ref 0–0.61)
EOSINOPHIL NFR BLD AUTO: 1 % (ref 0–6)
ERYTHROCYTE [DISTWIDTH] IN BLOOD BY AUTOMATED COUNT: 16.5 % (ref 11.6–15.1)
GFR SERPL CREATININE-BSD FRML MDRD: 44 ML/MIN/1.73SQ M
GLUCOSE SERPL-MCNC: 103 MG/DL (ref 65–140)
HCT VFR BLD AUTO: 31.9 % (ref 36.5–49.3)
HGB BLD-MCNC: 9.7 G/DL (ref 12–17)
IMM GRANULOCYTES # BLD AUTO: 0.04 THOUSAND/UL (ref 0–0.2)
IMM GRANULOCYTES NFR BLD AUTO: 1 % (ref 0–2)
INR PPP: 1.2 (ref 0.84–1.19)
LYMPHOCYTES # BLD AUTO: 0.43 THOUSANDS/ΜL (ref 0.6–4.47)
LYMPHOCYTES NFR BLD AUTO: 6 % (ref 14–44)
MCH RBC QN AUTO: 31.2 PG (ref 26.8–34.3)
MCHC RBC AUTO-ENTMCNC: 30.4 G/DL (ref 31.4–37.4)
MCV RBC AUTO: 103 FL (ref 82–98)
MONOCYTES # BLD AUTO: 0.79 THOUSAND/ΜL (ref 0.17–1.22)
MONOCYTES NFR BLD AUTO: 12 % (ref 4–12)
NEUTROPHILS # BLD AUTO: 5.46 THOUSANDS/ΜL (ref 1.85–7.62)
NEUTS SEG NFR BLD AUTO: 80 % (ref 43–75)
NRBC BLD AUTO-RTO: 0 /100 WBCS
NT-PROBNP SERPL-MCNC: 4539 PG/ML
PLATELET # BLD AUTO: 162 THOUSANDS/UL (ref 149–390)
PMV BLD AUTO: 11.2 FL (ref 8.9–12.7)
POTASSIUM SERPL-SCNC: 4.3 MMOL/L (ref 3.5–5.3)
PROT SERPL-MCNC: 5.6 G/DL (ref 6.4–8.2)
PROTHROMBIN TIME: 15 SECONDS (ref 11.6–14.5)
RBC # BLD AUTO: 3.11 MILLION/UL (ref 3.88–5.62)
SODIUM SERPL-SCNC: 141 MMOL/L (ref 136–145)
WBC # BLD AUTO: 6.77 THOUSAND/UL (ref 4.31–10.16)

## 2022-04-20 PROCEDURE — 93971 EXTREMITY STUDY: CPT

## 2022-04-20 PROCEDURE — 85610 PROTHROMBIN TIME: CPT | Performed by: EMERGENCY MEDICINE

## 2022-04-20 PROCEDURE — 83880 ASSAY OF NATRIURETIC PEPTIDE: CPT | Performed by: EMERGENCY MEDICINE

## 2022-04-20 PROCEDURE — 93005 ELECTROCARDIOGRAM TRACING: CPT

## 2022-04-20 PROCEDURE — 85730 THROMBOPLASTIN TIME PARTIAL: CPT | Performed by: EMERGENCY MEDICINE

## 2022-04-20 PROCEDURE — 80053 COMPREHEN METABOLIC PANEL: CPT | Performed by: EMERGENCY MEDICINE

## 2022-04-20 PROCEDURE — 36415 COLL VENOUS BLD VENIPUNCTURE: CPT | Performed by: EMERGENCY MEDICINE

## 2022-04-20 PROCEDURE — 96374 THER/PROPH/DIAG INJ IV PUSH: CPT

## 2022-04-20 PROCEDURE — 99285 EMERGENCY DEPT VISIT HI MDM: CPT | Performed by: EMERGENCY MEDICINE

## 2022-04-20 PROCEDURE — 71045 X-RAY EXAM CHEST 1 VIEW: CPT

## 2022-04-20 PROCEDURE — 93971 EXTREMITY STUDY: CPT | Performed by: INTERNAL MEDICINE

## 2022-04-20 PROCEDURE — 85025 COMPLETE CBC W/AUTO DIFF WBC: CPT | Performed by: EMERGENCY MEDICINE

## 2022-04-20 PROCEDURE — 88305 TISSUE EXAM BY PATHOLOGIST: CPT | Performed by: PATHOLOGY

## 2022-04-20 PROCEDURE — 99284 EMERGENCY DEPT VISIT MOD MDM: CPT

## 2022-04-20 PROCEDURE — 43239 EGD BIOPSY SINGLE/MULTIPLE: CPT | Performed by: INTERNAL MEDICINE

## 2022-04-20 PROCEDURE — 45378 DIAGNOSTIC COLONOSCOPY: CPT | Performed by: INTERNAL MEDICINE

## 2022-04-20 RX ORDER — LIDOCAINE HYDROCHLORIDE 10 MG/ML
INJECTION, SOLUTION EPIDURAL; INFILTRATION; INTRACAUDAL; PERINEURAL AS NEEDED
Status: DISCONTINUED | OUTPATIENT
Start: 2022-04-20 | End: 2022-04-20

## 2022-04-20 RX ORDER — PROPOFOL 10 MG/ML
INJECTION, EMULSION INTRAVENOUS AS NEEDED
Status: DISCONTINUED | OUTPATIENT
Start: 2022-04-20 | End: 2022-04-20

## 2022-04-20 RX ORDER — FUROSEMIDE 40 MG/1
40 TABLET ORAL DAILY
Qty: 5 TABLET | Refills: 0 | Status: SHIPPED | OUTPATIENT
Start: 2022-04-21 | End: 2022-04-26

## 2022-04-20 RX ORDER — SODIUM CHLORIDE, SODIUM LACTATE, POTASSIUM CHLORIDE, CALCIUM CHLORIDE 600; 310; 30; 20 MG/100ML; MG/100ML; MG/100ML; MG/100ML
INJECTION, SOLUTION INTRAVENOUS CONTINUOUS PRN
Status: DISCONTINUED | OUTPATIENT
Start: 2022-04-20 | End: 2022-04-20

## 2022-04-20 RX ORDER — FUROSEMIDE 40 MG/1
40 TABLET ORAL DAILY
Qty: 5 TABLET | Refills: 0 | Status: SHIPPED | OUTPATIENT
Start: 2022-04-21 | End: 2022-04-20 | Stop reason: SDUPTHER

## 2022-04-20 RX ORDER — FUROSEMIDE 10 MG/ML
40 INJECTION INTRAMUSCULAR; INTRAVENOUS ONCE
Status: COMPLETED | OUTPATIENT
Start: 2022-04-20 | End: 2022-04-20

## 2022-04-20 RX ORDER — POTASSIUM CHLORIDE 750 MG/1
10 TABLET, EXTENDED RELEASE ORAL DAILY
Qty: 5 TABLET | Refills: 0 | Status: SHIPPED | OUTPATIENT
Start: 2022-04-21 | End: 2022-04-20 | Stop reason: SDUPTHER

## 2022-04-20 RX ORDER — POTASSIUM CHLORIDE 750 MG/1
10 TABLET, EXTENDED RELEASE ORAL DAILY
Qty: 5 TABLET | Refills: 0 | Status: SHIPPED | OUTPATIENT
Start: 2022-04-21 | End: 2022-04-26

## 2022-04-20 RX ADMIN — PROPOFOL 80 MG: 10 INJECTION, EMULSION INTRAVENOUS at 10:16

## 2022-04-20 RX ADMIN — PROPOFOL 20 MG: 10 INJECTION, EMULSION INTRAVENOUS at 10:54

## 2022-04-20 RX ADMIN — LIDOCAINE HYDROCHLORIDE 50 MG: 10 INJECTION, SOLUTION EPIDURAL; INFILTRATION; INTRACAUDAL; PERINEURAL at 10:16

## 2022-04-20 RX ADMIN — PROPOFOL 20 MG: 10 INJECTION, EMULSION INTRAVENOUS at 10:49

## 2022-04-20 RX ADMIN — PROPOFOL 20 MG: 10 INJECTION, EMULSION INTRAVENOUS at 10:34

## 2022-04-20 RX ADMIN — SODIUM CHLORIDE, SODIUM LACTATE, POTASSIUM CHLORIDE, AND CALCIUM CHLORIDE: .6; .31; .03; .02 INJECTION, SOLUTION INTRAVENOUS at 10:15

## 2022-04-20 RX ADMIN — FUROSEMIDE 40 MG: 10 INJECTION, SOLUTION INTRAMUSCULAR; INTRAVENOUS at 14:37

## 2022-04-20 RX ADMIN — PROPOFOL 20 MG: 10 INJECTION, EMULSION INTRAVENOUS at 11:00

## 2022-04-20 RX ADMIN — PROPOFOL 20 MG: 10 INJECTION, EMULSION INTRAVENOUS at 10:39

## 2022-04-20 RX ADMIN — PROPOFOL 20 MG: 10 INJECTION, EMULSION INTRAVENOUS at 10:25

## 2022-04-20 RX ADMIN — PROPOFOL 20 MG: 10 INJECTION, EMULSION INTRAVENOUS at 10:43

## 2022-04-20 NOTE — PROGRESS NOTES
Pt's daughter is concerned about the asymmetrical size of the pt's legs  He supposedly has had blood clots before and the daughter would like to get the pt's legs checked  Pt discharged from Endo and transferred to ED for eval   Pt taken by stretcher with daughter and belongings to ED

## 2022-04-20 NOTE — H&P
History and Physical - [facility]  Kylah Castro 80 y o  male MRN: [id number]    HPI: Kylah Castro is a 80y o  year old male who presents for EGD colonoscopy for history of Villalobos syndrome and history of malignant polyp of the colon removed  1  Cancer of sigmoid colon [facility])  ---Patient with a malignant polyp resected endoscopically in [facility] by gastroenterologist in spring of 2021  Margins showed invasive carcinoma  Polyp was about 2 2 cm in greatest diameter  Patient had a repeat sigmoidoscopy and the endoscopies could not relocate the polyp in evidently it had been eradicated  - discussed the situation with the patient and daughter  Conceivably the patient may have no residual polyp were cancer tissue   -  Recommend repeat colonoscopy at hospital setting [place] or [address] [place] [address] in the near future   GoLYTELY or MiraLax prep  -     2  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 ([facility] [address] )  -- patient with 5 radiation therapies- appears to be in remission      3  Villalobos syndrome     --  Patient with multiple cancers related to [facility]  He had a hemicolectomy at age 72 for right colon cancer  This was in the late 1990s  Ten years later had left-sided colon cancer in her resection  Most recently malignant polyp noted  --EGD at same time as colonoscopy      4  H/O right hemicolectomy  -- please see above     5  Encounter for current long-term use of anticoagulants  -- patient had a history of a stroke and history of PE in the past   Will hold  Xareltofor 48 hours prior to  - check with patient's cardiologist   Some small risk of embolic or thrombotic activity with holding the anticoagulate but would be able to do polypectomy if necessary     6  COVID-19  -- a patient without complications related to [place]  He did have aspiration pneumonia but this was prior to COVID diagnosis  This is in late December    Did not require hospitalization for the "     7  Malignant neoplasm of ascending colon ([facility] [address] )  ---reason for the right hemicolectomy back in the 1990s     8  History of kidney cancer  -- left nephrectomy also patient had uterine polyp in the past- also related to NSAID     9  Chronic kidney disease, unspecified CKD stage  -- stage 4 kidney disease  Patient's creatinine was about 3 3 back in January     10  Diarrhea, unspecified type  -- patient reports about 4 loose bowel movements per day  This is a little worse than usual   May be related to his multiple colon resections  Try loperamide 2 mg 2 tablets twice a day before breakfast and before lunch  - could consider  Trial of Questran if not effective       REVIEW OF SYSTEMS: Per the HPI, and otherwise unremarkable      Historical Information   Past Medical History:   Diagnosis Date    Allergic rhinitis     Anemia     Atrial fibrillation (HCC)     [facility]     Cardiac defibrillator in place     Chronic diastolic (congestive) heart failure (HCC)     Chronic kidney disease     Colon cancer (HCC)     Colon polyp     COPD (chronic obstructive pulmonary disease) (HCC)     Coronary artery disease     Diabetes mellitus [facility]     Dysphagia     GERD (gastroesophageal reflux disease)     Hyperkalemia     Hyperlipidemia     Hyperosmolality and hypernatremia     Hypertension     Hypothyroidism     Lung cancer [facility]     Neuromuscular dysfunction of bladder, unspecified     Nonrheumatic aortic (valve) stenosis     Polyneuropathy     Pulmonary embolism (HCC)     Sick sinus syndrome ([facility]     Venous insufficiency      Past Surgical History:   Procedure Laterality Date    ABDOMINAL SURGERY      APPENDECTOMY      CARDIAC DEFIBRILLATOR PLACEMENT      CARDIAC PACEMAKER PLACEMENT      COLON SURGERY      COLONOSCOPY      HERNIA REPAIR      UPPER GASTROINTESTINAL ENDOSCOPY       Social History   Social History     Substance and Sexual Activity   Alcohol Use Not Currently     Social History Substance and Sexual Activity   Drug Use Never     Social History     Tobacco Use   Smoking Status Never Smoker   Smokeless Tobacco Never Used     Family History   Problem Relation Age of Onset    Heart disease Mother     Heart disease Father     Cancer Sister     Cancer Brother        Meds/Allergies       Current Outpatient Medications:     escitalopram (LEXAPRO) 5 mg tablet    famotidine (PEPCID) 20 mg tablet    levothyroxine 25 mcg tablet    metoprolol succinate (TOPROL-XL) 50 mg 24 hr tablet    rivaroxaban (Xarelto) 15 mg tablet    acetaminophen (TYLENOL) 325 mg tablet    albuterol (Albuterol Sulfate) (5 mg/mL) 0 5 % nebulizer solution    atorvastatin (LIPITOR) 40 mg tablet    cholecalciferol (VITAMIN D3) 1,000 units tablet    cyanocobalamin (VITAMIN B-12) 1000 MCG tablet    finasteride (PROSCAR) 5 mg tablet    fluticasone-umeclidinium-vilanterol (Trelegy Ellipta) 100-62 5-25 MCG/INH inhaler    loperamide (IMODIUM) 2 mg capsule    Multiple Vitamins-Minerals (PRESERVISION AREDS PO)    polyethylene glycol (COLYTE) 4000 mL solution    Sod Picosulfate-Mag Ox-Cit Acd (Clenpiq) 10-3 5-12 MG-GM -GM/160ML SOLN    sodium chloride (Deep Sea Nasal [place]) 0 65 % nasal spray    tamsulosin (Flomax) 0 4 mg    Allergies   Allergen Reactions    Aspirin Other (See Comments)     Unknown    Augmentin [Amoxicillin-Pot Clavulanate] Other (See Comments)     Unknown      Avelox [Moxifloxacin] Other (See Comments)     Unknown      Cephalosporins Other (See Comments)     unknown    Clindamycin Other (See Comments)     unknown    Iv Contrast [Iodinated Diagnostic Agents] Other (See Comments)     unknown    Latex Other (See Comments)     Unknown      Lisinopril Other (See Comments)     Unknown      Penicillins Other (See Comments)     Unknown         Objective     /70   Pulse 78   Temp (!) 97 1 °F (36 2 °C)   Resp 20   Ht 6' (1 829 m)   Wt 86 2 kg (190 lb)   SpO2 98%   BMI 25 77 kg/m² PHYSICAL EXAM    Gen: NAD AAOx3  Head: Normocephalic, Atraumatic  CV: S1S2 RRR no m/r/g  CHEST: Clear b/l no c/r/w  ABD: soft, +BS NT/ND  EXT: no edema    ASSESSMENT/PLAN:  This is a 80y o  year old male here for EGD colonoscopy, and he is stable and optimized for his procedure

## 2022-04-20 NOTE — ED PROVIDER NOTES
History  Chief Complaint   Patient presents with    Leg Pain     patient presents to the ED with c/o right leg swelling, denies pain and states he does not know how long it has been swollen for      This is an 59-year-old male presents with right slid swelling that started this morning denies any chest pain or shortness of breath does have a prior history of AFib and PE and is currently on Xarelto   Does have a history of congestive heart failure I do not see a diuretic listed on medication list   Patient has recent history of aspiration pneumonia  Had outpatient colonoscopy done today and referred here secondary to leg swelling  He did receive 300 mL of lactated Ringer's during the procedure  Patient also has a history of right-sided lung cancer  History provided by:  Patient  Medical Problem  Location:  Right leg  Quality:  Swelling  Severity:  Moderate  Onset quality:  Gradual  Timing:  Constant  Progression:  Worsening  Chronicity:  New  Context:  Right leg swelling since this morning  Relieved by:  Nothing  Worsened by:  Nothing  Associated symptoms: no chest pain and no shortness of breath        Prior to Admission Medications   Prescriptions Last Dose Informant Patient Reported? Taking?    Multiple Vitamins-Minerals (PRESERVISION AREDS PO)  Outside Facility (Specify) Yes No   Sig: Take by mouth daily   Sod Picosulfate-Mag Ox-Cit Acd (Clenpiq) 10-3 5-12 MG-GM -GM/160ML SOLN   No No   Sig: Take prep as directed   acetaminophen (TYLENOL) 325 mg tablet  Outside Facility (Specify) Yes No   Sig: Take 650 mg by mouth every 4 (four) hours as needed for mild pain   albuterol (Albuterol Sulfate) (5 mg/mL) 0 5 % nebulizer solution  Outside Facility (Specify) Yes No   Sig: Take 2 5 mg by nebulization every 4 (four) hours as needed for wheezing   atorvastatin (LIPITOR) 40 mg tablet  Outside Facility (Specify) Yes No   Sig: Take 40 mg by mouth daily   cholecalciferol (VITAMIN D3) 1,000 units tablet  Outside Facility (Specify) Yes No   Sig: Take 1,000 Units by mouth daily   cyanocobalamin (VITAMIN B-12) 1000 MCG tablet  Outside Facility (Specify) Yes No   Sig: Take 1,000 mcg by mouth daily   escitalopram (LEXAPRO) 5 mg tablet  Outside Facility (Specify) Yes No   Sig: Take 5 mg by mouth daily   famotidine (PEPCID) 20 mg tablet  Outside Facility (Specify) Yes No   Sig: Take 20 mg by mouth daily   finasteride (PROSCAR) 5 mg tablet  Outside Facility (Specify) Yes No   Sig: Take 5 mg by mouth daily   fluticasone-umeclidinium-vilanterol (Trelegy Ellipta) 100-62 5-25 MCG/INH inhaler  Outside Facility (Specify) Yes No   Sig: Inhale 1 puff daily Rinse mouth after use     levothyroxine 25 mcg tablet  Outside Facility (Specify) Yes No   Sig: Take 25 mcg by mouth daily   loperamide (IMODIUM) 2 mg capsule   No No   Sig: Take 1 capsule (2 mg total) by mouth 4 (four) times a day as needed for diarrhea   metoprolol succinate (TOPROL-XL) 50 mg 24 hr tablet  Outside Facility (Specify) Yes No   Sig: Take 50 mg by mouth daily   polyethylene glycol (COLYTE) 4000 mL solution   No No   Sig: Take 4,000 mL by mouth once for 1 dose   rivaroxaban (Xarelto) 15 mg tablet  Outside Facility (Specify) Yes No   Sig: Take 15 mg by mouth daily with breakfast   sodium chloride (Deep Sea Nasal [place]) 0 65 % nasal spray  Outside Facility (Specify) Yes No   Si spray into each nostril 2 (two) times a day   tamsulosin (Flomax) 0 4 mg  Outside Facility (Specify) Yes No   Sig: Take 0 4 mg by mouth daily with dinner      Facility-Administered Medications: None       Past Medical History:   Diagnosis Date    Allergic rhinitis     Anemia     Atrial fibrillation (HCC)     Cancer of kidney ([facility] )     Cardiac defibrillator in place     Chronic diastolic (congestive) heart failure (HCC)     Chronic kidney disease     Colon cancer ([place] [address] )     Colon polyp     COPD (chronic obstructive pulmonary disease) ([place] [address] )     Coronary artery disease     Diabetes mellitus ([facility] )     Dysphagia     GERD (gastroesophageal reflux disease)     Hyperkalemia     Hyperlipidemia     Hyperosmolality and hypernatremia     Hypertension     Hypothyroidism     Lung cancer ([facility] )     Neuromuscular dysfunction of bladder, unspecified     Nonrheumatic aortic (valve) stenosis     Polyneuropathy     Pulmonary embolism (HCC)     Sick sinus syndrome ([facility] )     Venous insufficiency        Past Surgical History:   Procedure Laterality Date    ABDOMINAL SURGERY      APPENDECTOMY      CARDIAC DEFIBRILLATOR PLACEMENT      CARDIAC PACEMAKER PLACEMENT      COLON SURGERY      COLONOSCOPY      HERNIA REPAIR      UPPER GASTROINTESTINAL ENDOSCOPY         Family History   Problem Relation Age of Onset    Heart disease Mother     Heart disease Father     Cancer Sister     Cancer Brother      I have reviewed and agree with the history as documented  E-Cigarette/Vaping    E-Cigarette Use Never User      E-Cigarette/Vaping Substances    Nicotine No     THC No     CBD No     Flavoring No     Other No     Unknown No      Social History     Tobacco Use    Smoking status: Never Smoker    Smokeless tobacco: Never Used   Vaping Use    Vaping Use: Never used   Substance Use Topics    Alcohol use: Not Currently    Drug use: Never       Review of Systems   Respiratory: Negative for shortness of breath  Cardiovascular: Positive for leg swelling  Negative for chest pain  Skin: Positive for wound (Bilateral knee abrasions from old fall)  All other systems reviewed and are negative  Physical Exam  Physical Exam  Vitals and nursing note reviewed  Constitutional:       General: He is not in acute distress  Appearance: He is not ill-appearing, toxic-appearing or diaphoretic  HENT:      Head: Normocephalic and atraumatic  Right Ear: External ear normal       Left Ear: External ear normal       Nose: Nose normal    Eyes:      General: No scleral icterus          Right eye: No discharge  Left eye: No discharge  Extraocular Movements: Extraocular movements intact  Pupils: Pupils are equal, round, and reactive to light  Cardiovascular:      Rate and Rhythm: Normal rate and regular rhythm  Heart sounds: Murmur heard  No gallop  Pulmonary:      Effort: Pulmonary effort is normal  No respiratory distress  Breath sounds: No stridor  Rales present  No wheezing or rhonchi  Abdominal:      General: There is no distension  Palpations: Abdomen is soft  Tenderness: There is no abdominal tenderness  There is no guarding or rebound  Musculoskeletal:         General: No tenderness or signs of injury  Cervical back: Normal range of motion and neck supple  No rigidity or tenderness  Right lower leg: Edema present  Left lower leg: Edema present  Skin:     Coloration: Skin is not jaundiced  Comments: Bilateral knee abrasions from a previous fall   Neurological:      General: No focal deficit present  Mental Status: He is alert and oriented to person, place, and time  Cranial Nerves: No cranial nerve deficit  Sensory: No sensory deficit  Coordination: Coordination normal    Psychiatric:         Mood and Affect: Mood normal          Behavior: Behavior normal          Thought Content:  Thought content normal          Vital Signs  ED Triage Vitals   Temperature Pulse Respirations Blood Pressure SpO2   04/20/22 1221 04/20/22 1221 04/20/22 1221 04/20/22 1221 04/20/22 1221   (!) 97 3 °F (36 3 °C) 70 18 122/59 94 %      Temp Source Heart Rate Source Patient Position - Orthostatic VS BP Location FiO2 (%)   04/20/22 1221 04/20/22 1219 -- -- --   Temporal Monitor         Pain Score       04/20/22 1219       No Pain           Vitals:    04/20/22 1221 04/20/22 1430   BP: 122/59 127/60   Pulse: 70          Visual Acuity      ED Medications  Medications   furosemide (LASIX) injection 40 mg (40 mg Intravenous Given 4/20/22 1437) Diagnostic Studies  Results Reviewed     Procedure Component Value Units Date/Time    NT-BNP PRO [[id number]]  (Abnormal) Collected: 04/20/22 1247    Lab Status: Final result Specimen: Blood from Arm, Right Updated: 04/20/22 1321     NT-proBNP 4,539 pg/mL     Protime-INR [[id number]]  (Abnormal) Collected: 04/20/22 1247    Lab Status: Final result Specimen: Blood from Arm, Right Updated: 04/20/22 1320     Protime 15 0 seconds      INR 1 20    APTT [[id number]]  (Normal) Collected: 04/20/22 1247    Lab Status: Final result Specimen: Blood from Arm, Right Updated: 04/20/22 1320     PTT 33 seconds     Comprehensive metabolic panel [[id number]]  (Abnormal) Collected: 04/20/22 1247    Lab Status: Final result Specimen: Blood from Arm, Right Updated: 04/20/22 1315     Sodium 141 mmol/L      Potassium 4 3 mmol/L      Chloride 110 mmol/L      CO2 25 mmol/L      ANION GAP 6 mmol/L      BUN 15 mg/dL      Creatinine 1 39 mg/dL      Glucose 103 mg/dL      Calcium 8 1 mg/dL      Corrected Calcium 9 6 mg/dL      AST 22 U/L      ALT 45 U/L      Alkaline Phosphatase 117 U/L      Total Protein 5 6 g/dL      Albumin 2 1 g/dL      Total Bilirubin 0 30 mg/dL      eGFR 44 ml/min/1 73sq m     Narrative:      [facility] guidelines for Chronic Kidney Disease (CKD):     Stage 1 with normal or high GFR (GFR > 90 mL/min/1 73 square meters)    Stage 2 Mild CKD (GFR = 60-89 mL/min/1 73 square meters)    Stage 3A Moderate CKD (GFR = 45-59 mL/min/1 73 square meters)    Stage 3B Moderate CKD (GFR = 30-44 mL/min/1 73 square meters)    Stage 4 Severe CKD (GFR = 15-29 mL/min/1 73 square meters)    Stage 5 End Stage CKD (GFR <15 mL/min/1 73 square meters)  Note: GFR calculation is accurate only with a steady state creatinine    CBC and differential [[id number]]  (Abnormal) Collected: 04/20/22 1247    Lab Status: Final result Specimen: Blood from Arm, Right Updated: 04/20/22 1254     WBC 6 77 Thousand/uL      RBC 3 11 Million/uL      Hemoglobin 9 7 g/dL      Hematocrit 31 9 %       fL      MCH 31 2 pg      MCHC 30 4 g/dL      RDW 16 5 %      MPV 11 2 fL      Platelets 513 Thousands/uL      nRBC 0 /100 WBCs      Neutrophils Relative 80 %      Immat GRANS % 1 %      Lymphocytes Relative 6 %      Monocytes Relative 12 %      Eosinophils Relative 1 %      Basophils Relative 0 %      Neutrophils Absolute 5 46 Thousands/µL      Immature Grans Absolute 0 04 Thousand/uL      Lymphocytes Absolute 0 43 Thousands/µL      Monocytes Absolute 0 79 Thousand/µL      Eosinophils Absolute 0 04 Thousand/µL      Basophils Absolute 0 01 Thousands/µL                  XR chest 1 view portable   ED Interpretation by Stanton Martinez DO (04/20 1332)   Pulmonary vascular congestion consistent with congestive heart failure      Final Result by Joseph Carballo MD (04/20 6693)      Cardiomegaly with mild interstitial pulmonary edema and focal patchy airspace opacity in the right midlung which may related to the pulmonary edema or superimposed pneumonia  Probable hiatal hernia                          Workstation performed: [id number]         VAS lower limb venous duplex study, unilateral/limited   Final Result by Lm Cardoza MD (04/20 2660)                 Procedures  ECG 12 Lead Documentation Only    Date/Time: 4/20/2022 12:59 PM  Performed by: Stanton Martinez DO  Authorized by: Stanton Martinez DO     ECG reviewed by me, the ED Provider: yes    Patient location:  ED  Rate:     ECG rate:  71  Rhythm:     Rhythm: paced    T waves:     T waves: normal               ED Course  ED Course as of 04/20/22 1726   Wed Apr 20, 2022   1309 Vascular tech reports 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 no acute clots   1350 Reviewed patient's lab results from [facility] his hemoglobin and renal insufficiency are chronic   1351 Patient was able to ambulate in the emergency room without any problems and his room air saturation dropped down to only 90% at its lowest   Will try outpatient diuretic  MDM  Number of Diagnoses or Management Options  Diagnosis management comments: Right lower extremity swelling rule out venous insufficiency Baker cyst or DVT workup in progress check labs and vascular duplex ultrasound       Amount and/or Complexity of Data Reviewed  Clinical lab tests: ordered        Disposition  Final diagnoses:   Fluid overload     Time reflects when diagnosis was documented in both MDM as applicable and the Disposition within this note     Time User Action Codes Description Comment    4/20/2022  1:58 PM Queenie Lawton Add [M13 67] Fluid overload       ED Disposition     ED Disposition Condition Date/Time Comment    Discharge Stable Wed Apr 20, 2022  1:58 PM Bryan Marifer discharge to home/self care              Follow-up Information     Follow up With Specialties Details Why Contact Info Additional Information    Mindy Fontanez MD  In 2 days  [address] [place] [address] Emergency Department Emergency Medicine  As needed, If symptoms worsen [address] [place], [address]  [phone] [facility] [place] [facility] Emergency Department, [address] [place] [address], [address] [place] [address] Lety Warren Rony 10          Discharge Medication List as of 4/20/2022  3:03 PM      CONTINUE these medications which have CHANGED    Details   furosemide (LASIX) 40 mg tablet Take 1 tablet (40 mg total) by mouth daily for 5 days, Starting Thu 4/21/2022, Until Tue 4/26/2022, Print      potassium chloride (K-DUR,KLOR-CON) 10 mEq tablet Take 1 tablet (10 mEq total) by mouth daily for 5 days, Starting Thu 4/21/2022, Until Tue 4/26/2022, Print         CONTINUE these medications which have NOT CHANGED    Details   acetaminophen (TYLENOL) 325 mg tablet Take 650 mg by mouth every 4 (four) hours as needed for mild pain, Historical Med albuterol (Albuterol Sulfate) (5 mg/mL) 0 5 % nebulizer solution Take 2 5 mg by nebulization every 4 (four) hours as needed for wheezing, Historical Med      atorvastatin (LIPITOR) 40 mg tablet Take 40 mg by mouth daily, Historical Med      cholecalciferol (VITAMIN D3) 1,000 units tablet Take 1,000 Units by mouth daily, Historical Med      cyanocobalamin (VITAMIN B-12) 1000 MCG tablet Take 1,000 mcg by mouth daily, Historical Med      escitalopram (LEXAPRO) 5 mg tablet Take 5 mg by mouth daily, Historical Med      famotidine (PEPCID) 20 mg tablet Take 20 mg by mouth daily, Historical Med      finasteride (PROSCAR) 5 mg tablet Take 5 mg by mouth daily, Historical Med      fluticasone-umeclidinium-vilanterol (Trelegy Ellipta) 100-62 5-25 MCG/INH inhaler Inhale 1 puff daily Rinse mouth after use , Historical Med      levothyroxine 25 mcg tablet Take 25 mcg by mouth daily, Historical Med      loperamide (IMODIUM) 2 mg capsule Take 1 capsule (2 mg total) by mouth 4 (four) times a day as needed for diarrhea, Starting Mon 4/11/2022, Until Wed 5/11/2022 at 2359, Normal      metoprolol succinate (TOPROL-XL) 50 mg 24 hr tablet Take 50 mg by mouth daily, Historical Med      Multiple Vitamins-Minerals (PRESERVISION AREDS PO) Take by mouth daily, Historical Med      polyethylene glycol (COLYTE) 4000 mL solution Take 4,000 mL by mouth once for 1 dose, Starting Mon 3/14/2022, Normal      rivaroxaban (Xarelto) 15 mg tablet Take 15 mg by mouth daily with breakfast, Historical Med      Sod Picosulfate-Mag Ox-Cit Acd (Clenpiq) 10-3 5-12 MG-GM -GM/160ML SOLN Take prep as directed, Print      sodium chloride (Deep Sea Nasal [place]) 0 65 % nasal spray 1 spray into each nostril 2 (two) times a day, Historical Med      tamsulosin (Flomax) 0 4 mg Take 0 4 mg by mouth daily with dinner, Historical Med             No discharge procedures on file      PDMP Review     None          ED Provider  Electronically Signed by           Hamlet Chavez Liane Monson, DO  04/20/22 2080 Mercedes Cantrell, DO  04/20/22 172

## 2022-04-22 LAB
ATRIAL RATE: 70 BPM
QRS AXIS: -74 DEGREES
QRSD INTERVAL: 182 MS
QT INTERVAL: 454 MS
QTC INTERVAL: 493 MS
T WAVE AXIS: 69 DEGREES
VENTRICULAR RATE: 71 BPM

## 2022-04-22 PROCEDURE — 93010 ELECTROCARDIOGRAM REPORT: CPT | Performed by: INTERNAL MEDICINE

## 2022-05-09 ENCOUNTER — TELEPHONE (OUTPATIENT)
Dept: GASTROENTEROLOGY | Facility: CLINIC | Age: 87
End: 2022-05-09

## 2022-05-09 NOTE — TELEPHONE ENCOUNTER
Patient's daughter called stating her father is concerned about his pathology results  Please call Yolanda Whatley [phone]

## 2022-05-16 NOTE — TELEPHONE ENCOUNTER
Pt's daughter, Skye Miles, called for bx results  Conf'd CB# above - [phone]  She was very vocal about her frustrations:  Pt is giving her a hard time; she is going away/wants results  I noted TE in to [facility]  She states "this is maddening" to be waiting for results    After multiple apologies I suggested sending req for CB to NP

## 2022-05-18 NOTE — RESULT ENCOUNTER NOTE
Spoke with daughter Nissa Fang regarding results  Polyps are benign adenoma's  Given his age, 80years old, recommend one year follow up office visit to evaluate his health status  If he's in good health I would repeat the upper endoscopy and re-biopsy these   I did explain to his daughter that had he been younger we would've considered surgery or endoscopic removal but if this age I'm not nazario

## 2022-05-18 NOTE — TELEPHONE ENCOUNTER
Katerina Ng, I will call  Waiting to consider what to do  Duod winston would ussually plan surgery but not in an 81 yo  Will probably re check w/ egd in 1 year health permintting   Doubt these are causing any sxs, B

## 2022-05-24 NOTE — TELEPHONE ENCOUNTER
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
